# Patient Record
Sex: FEMALE | Race: OTHER | Employment: STUDENT | URBAN - METROPOLITAN AREA
[De-identification: names, ages, dates, MRNs, and addresses within clinical notes are randomized per-mention and may not be internally consistent; named-entity substitution may affect disease eponyms.]

---
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
## 2024-09-15 ENCOUNTER — HOSPITAL ENCOUNTER (EMERGENCY)
Facility: HOSPITAL | Age: 19
Discharge: HOME | End: 2024-09-15
Attending: EMERGENCY MEDICINE
Payer: COMMERCIAL

## 2024-09-15 ENCOUNTER — APPOINTMENT (OUTPATIENT)
Dept: ULTRASOUND IMAGING | Facility: HOSPITAL | Age: 19
End: 2024-09-15
Attending: EMERGENCY MEDICINE
Payer: COMMERCIAL

## 2024-09-15 VITALS
WEIGHT: 166 LBS | TEMPERATURE: 99 F | HEIGHT: 64 IN | SYSTOLIC BLOOD PRESSURE: 102 MMHG | HEART RATE: 79 BPM | BODY MASS INDEX: 28.34 KG/M2 | OXYGEN SATURATION: 100 % | DIASTOLIC BLOOD PRESSURE: 56 MMHG | RESPIRATION RATE: 22 BRPM

## 2024-09-15 DIAGNOSIS — O21.0 HYPEREMESIS GRAVIDARUM (HCC): Primary | ICD-10-CM

## 2024-09-15 DIAGNOSIS — O20.9 BLEEDING IN EARLY PREGNANCY (HCC): ICD-10-CM

## 2024-09-15 DIAGNOSIS — Z3A.01 LESS THAN 8 WEEKS GESTATION OF PREGNANCY (HCC): ICD-10-CM

## 2024-09-15 LAB
ALBUMIN SERPL-MCNC: 4.8 G/DL (ref 3.2–4.8)
ALBUMIN/GLOB SERPL: 1.3 {RATIO} (ref 1–2)
ALP LIVER SERPL-CCNC: 82 U/L
ALT SERPL-CCNC: 12 U/L
ANION GAP SERPL CALC-SCNC: 14 MMOL/L (ref 0–18)
AST SERPL-CCNC: 20 U/L (ref ?–34)
B-HCG SERPL-ACNC: ABNORMAL MIU/ML
B-HCG UR QL: POSITIVE
BASOPHILS # BLD AUTO: 0.02 X10(3) UL (ref 0–0.2)
BASOPHILS NFR BLD AUTO: 0.2 %
BILIRUB SERPL-MCNC: 0.6 MG/DL (ref 0.3–1.2)
BILIRUB UR QL STRIP.AUTO: NEGATIVE
BUN BLD-MCNC: 9 MG/DL (ref 9–23)
CALCIUM BLD-MCNC: 10.4 MG/DL (ref 8.7–10.4)
CHLORIDE SERPL-SCNC: 105 MMOL/L (ref 98–112)
CLARITY UR REFRACT.AUTO: CLEAR
CO2 SERPL-SCNC: 18 MMOL/L (ref 21–32)
CREAT BLD-MCNC: 0.55 MG/DL
EGFRCR SERPLBLD CKD-EPI 2021: 134 ML/MIN/1.73M2 (ref 60–?)
EOSINOPHIL # BLD AUTO: 0 X10(3) UL (ref 0–0.7)
EOSINOPHIL NFR BLD AUTO: 0 %
ERYTHROCYTE [DISTWIDTH] IN BLOOD BY AUTOMATED COUNT: 14.9 %
GLOBULIN PLAS-MCNC: 3.6 G/DL (ref 2–3.5)
GLUCOSE BLD-MCNC: 131 MG/DL (ref 70–99)
GLUCOSE UR STRIP.AUTO-MCNC: NORMAL MG/DL
HCT VFR BLD AUTO: 35.8 %
HGB BLD-MCNC: 12 G/DL
IMM GRANULOCYTES # BLD AUTO: 0.04 X10(3) UL (ref 0–1)
IMM GRANULOCYTES NFR BLD: 0.3 %
KETONES UR STRIP.AUTO-MCNC: >150 MG/DL
LEUKOCYTE ESTERASE UR QL STRIP.AUTO: NEGATIVE
LIPASE SERPL-CCNC: 27 U/L (ref 12–53)
LYMPHOCYTES # BLD AUTO: 0.67 X10(3) UL (ref 1–4)
LYMPHOCYTES NFR BLD AUTO: 5.8 %
MCH RBC QN AUTO: 26.3 PG (ref 26–34)
MCHC RBC AUTO-ENTMCNC: 33.5 G/DL (ref 31–37)
MCV RBC AUTO: 78.3 FL
MONOCYTES # BLD AUTO: 0.24 X10(3) UL (ref 0.1–1)
MONOCYTES NFR BLD AUTO: 2.1 %
NEUTROPHILS # BLD AUTO: 10.56 X10 (3) UL (ref 1.5–7.7)
NEUTROPHILS # BLD AUTO: 10.56 X10(3) UL (ref 1.5–7.7)
NEUTROPHILS NFR BLD AUTO: 91.6 %
NITRITE UR QL STRIP.AUTO: NEGATIVE
OSMOLALITY SERPL CALC.SUM OF ELEC: 284 MOSM/KG (ref 275–295)
PH UR STRIP.AUTO: 8 [PH] (ref 5–8)
PLATELET # BLD AUTO: 389 10(3)UL (ref 150–450)
POTASSIUM SERPL-SCNC: 3.6 MMOL/L (ref 3.5–5.1)
PROT SERPL-MCNC: 8.4 G/DL (ref 5.7–8.2)
PROT UR STRIP.AUTO-MCNC: 20 MG/DL
RBC # BLD AUTO: 4.57 X10(6)UL
RH BLOOD TYPE: POSITIVE
SODIUM SERPL-SCNC: 137 MMOL/L (ref 136–145)
SP GR UR STRIP.AUTO: 1.02 (ref 1–1.03)
UROBILINOGEN UR STRIP.AUTO-MCNC: NORMAL MG/DL
WBC # BLD AUTO: 11.5 X10(3) UL (ref 4–11)

## 2024-09-15 PROCEDURE — 93010 ELECTROCARDIOGRAM REPORT: CPT

## 2024-09-15 PROCEDURE — 99285 EMERGENCY DEPT VISIT HI MDM: CPT

## 2024-09-15 PROCEDURE — 80053 COMPREHEN METABOLIC PANEL: CPT | Performed by: EMERGENCY MEDICINE

## 2024-09-15 PROCEDURE — 81001 URINALYSIS AUTO W/SCOPE: CPT | Performed by: EMERGENCY MEDICINE

## 2024-09-15 PROCEDURE — 96361 HYDRATE IV INFUSION ADD-ON: CPT

## 2024-09-15 PROCEDURE — 84702 CHORIONIC GONADOTROPIN TEST: CPT | Performed by: EMERGENCY MEDICINE

## 2024-09-15 PROCEDURE — 96372 THER/PROPH/DIAG INJ SC/IM: CPT

## 2024-09-15 PROCEDURE — 83690 ASSAY OF LIPASE: CPT | Performed by: EMERGENCY MEDICINE

## 2024-09-15 PROCEDURE — 86901 BLOOD TYPING SEROLOGIC RH(D): CPT | Performed by: EMERGENCY MEDICINE

## 2024-09-15 PROCEDURE — 85025 COMPLETE CBC W/AUTO DIFF WBC: CPT | Performed by: EMERGENCY MEDICINE

## 2024-09-15 PROCEDURE — 85025 COMPLETE CBC W/AUTO DIFF WBC: CPT

## 2024-09-15 PROCEDURE — 96374 THER/PROPH/DIAG INJ IV PUSH: CPT

## 2024-09-15 PROCEDURE — 86900 BLOOD TYPING SEROLOGIC ABO: CPT | Performed by: EMERGENCY MEDICINE

## 2024-09-15 PROCEDURE — 76801 OB US < 14 WKS SINGLE FETUS: CPT | Performed by: EMERGENCY MEDICINE

## 2024-09-15 PROCEDURE — 81025 URINE PREGNANCY TEST: CPT

## 2024-09-15 PROCEDURE — 93005 ELECTROCARDIOGRAM TRACING: CPT

## 2024-09-15 PROCEDURE — 80053 COMPREHEN METABOLIC PANEL: CPT

## 2024-09-15 PROCEDURE — 76817 TRANSVAGINAL US OBSTETRIC: CPT | Performed by: EMERGENCY MEDICINE

## 2024-09-15 PROCEDURE — 96375 TX/PRO/DX INJ NEW DRUG ADDON: CPT

## 2024-09-15 PROCEDURE — 96376 TX/PRO/DX INJ SAME DRUG ADON: CPT

## 2024-09-15 RX ORDER — DIPHENHYDRAMINE HYDROCHLORIDE 50 MG/ML
25 INJECTION INTRAMUSCULAR; INTRAVENOUS ONCE
Status: COMPLETED | OUTPATIENT
Start: 2024-09-15 | End: 2024-09-15

## 2024-09-15 RX ORDER — DICYCLOMINE HCL 20 MG
20 TABLET ORAL 4 TIMES DAILY PRN
Qty: 30 TABLET | Refills: 0 | Status: SHIPPED | OUTPATIENT
Start: 2024-09-15 | End: 2024-10-15

## 2024-09-15 RX ORDER — DICYCLOMINE HYDROCHLORIDE 10 MG/ML
20 INJECTION INTRAMUSCULAR ONCE
Status: COMPLETED | OUTPATIENT
Start: 2024-09-15 | End: 2024-09-15

## 2024-09-15 RX ORDER — METOCLOPRAMIDE 10 MG/1
10 TABLET ORAL 3 TIMES DAILY PRN
Qty: 20 TABLET | Refills: 0 | Status: SHIPPED | OUTPATIENT
Start: 2024-09-15 | End: 2024-10-15

## 2024-09-15 RX ORDER — ONDANSETRON 2 MG/ML
4 INJECTION INTRAMUSCULAR; INTRAVENOUS ONCE
Status: COMPLETED | OUTPATIENT
Start: 2024-09-15 | End: 2024-09-15

## 2024-09-15 RX ORDER — MORPHINE SULFATE 4 MG/ML
4 INJECTION, SOLUTION INTRAMUSCULAR; INTRAVENOUS ONCE
Status: COMPLETED | OUTPATIENT
Start: 2024-09-15 | End: 2024-09-15

## 2024-09-15 RX ORDER — METOCLOPRAMIDE HYDROCHLORIDE 5 MG/ML
10 INJECTION INTRAMUSCULAR; INTRAVENOUS ONCE
Status: COMPLETED | OUTPATIENT
Start: 2024-09-15 | End: 2024-09-15

## 2024-09-15 NOTE — ED INITIAL ASSESSMENT (HPI)
Pt here for vomiting and abdominal pain  Per pt, \"I have a major stomach ache and can't stop throwing up. Yesterday, I just had some tacos and that's it.\"  On period currently. Smokes weed    Pt agitated in triage

## 2024-09-15 NOTE — ED PROVIDER NOTES
Patient Seen in: [facility] Emergency Department      History     Chief Complaint   Patient presents with    Nausea/Vomiting/Diarrhea     Stated Complaint: vomiting that started this morning    Subjective:   HPI    19-year-old female presents reporting pain in the epigastrium with nausea and vomiting.  Symptoms been present since this morning.  No diarrhea.  No fevers.  History of previous .  She does report regular marijuana use but says it has been a week.  She does not report frequent similar episodes.  Chart evaluation shows a visit 15 months ago with similar symptoms.  No urinary symptoms.  Currently on her menstrual cycle.    Objective:   Past Medical History:    ADHD    Anemia              Past Surgical History:   Procedure Laterality Date                      Social History     Socioeconomic History    Marital status: Single   Tobacco Use    Smoking status: Never     Passive exposure: Never    Smokeless tobacco: Never   Vaping Use    Vaping status: Never Used   Substance and Sexual Activity    Alcohol use: Not Currently    Drug use: Yes     Types: Cannabis, benzodiazepines     Comment: daily use of cannabis     Social Determinants of Health     Financial Resource Strain: Not at Risk (1/10/2024)    Received from [facility]    Financial Resource Strain     Financial Resource Strain: 1   Food Insecurity: Not at Risk (1/10/2024)    Received from [facility]    Food Insecurity     Food: 1   Transportation Needs: Not at Risk (1/10/2024)    Received from [facility]    Transportation Needs     Transportation: 1   Physical Activity: Not on File (1/10/2024)    Received from [facility]    Physical Activity     Physical Activity: 0   Stress: Not on File (1/10/2024)    Received from [facility]    Stress     Stress: 0   Social Connections: Not on File (9/10/2024)    Received from [facility]    Social Connections     Connectedness: 0   Housing Stability: Not at Risk (1/10/2024)    Received from [employer]    Housing Stability      [place]              Review of Systems    Positive for stated Chief Complaint: Nausea/Vomiting/Diarrhea    Other systems are as noted in HPI.  Constitutional and vital signs reviewed.      All other systems reviewed and negative except as noted above.    Physical Exam     ED Triage Vitals [09/15/24 1315]   /56   Pulse 81   Resp 22   Temp 98.7 °F (37.1 °C)   Temp src Temporal   SpO2 98 %   O2 Device None (Room air)       Current Vitals:   Vital Signs  BP: 102/56  Pulse: 90  Resp: 22  Temp: 98.7 °F (37.1 °C)  Temp src: Temporal    Oxygen Therapy  SpO2: 99 %  O2 Device: None (Room air)            Physical Exam    General:  Vitals as listed.  Appears uncomfortable  HEENT: Sclerae anicteric.  Conjunctivae show no pallor.  Oropharynx clear, mucous membranes moist   Neck: supple, no rigidity   Lungs: good air exchange and clear   Heart: regular rate rhythm and no murmur   Abdomen: Moderate tenderness on palpation of the epigastric region.  Negative Flores's.  Normal bowel sounds.  No abdominal masses.  No peritoneal signs   Extremities: no edema, normal peripheral pulses   Neuro: Alert oriented and nonfocal   Skin: no rashes or nodules    ED Course     Labs Reviewed   COMP METABOLIC PANEL (14) - Abnormal; Notable for the following components:       Result Value    Glucose 131 (*)     CO2 18.0 (*)     Total Protein 8.4 (*)     Globulin  3.6 (*)     All other components within normal limits   CBC WITH DIFFERENTIAL WITH PLATELET - Abnormal; Notable for the following components:    WBC 11.5 (*)     MCV 78.3 (*)     Neutrophil Absolute Prelim 10.56 (*)     Neutrophil Absolute 10.56 (*)     Lymphocyte Absolute 0.67 (*)     All other components within normal limits   URINALYSIS WITH CULTURE REFLEX - Abnormal; Notable for the following components:    Ketones Urine >150 (*)     Blood Urine 2+ (*)     Protein Urine 20 (*)     Bacteria Urine Rare (*)     Squamous Epi. Cells Few (*)     All other components within normal  limits   HCG, BETA SUBUNIT (QUANT PREGNANCY TEST) - Abnormal; Notable for the following components:    Hcg Quantitative 19,771.1 (*)     All other components within normal limits   POCT PREGNANCY URINE - Abnormal; Notable for the following components:    POCT Urine Pregnancy Positive (*)     All other components within normal limits   LIPASE - Normal   ABORH (BLOOD TYPE)   RAINBOW DRAW GOLD             US PREG 1ST TRIM W/EV (CPT=76801/36315)    Result Date: 9/15/2024  PROCEDURE:  US OB W/ EV 1ST TRIMESTER (CPT=76801/59690)  COMPARISON:  None.  INDICATIONS:  vomiting that started this morning  TECHNIQUE:  Transabdominal and endovaginal pelvic ultrasound examinations were performed.  PATIENT STATED HISTORY: (As transcribed by Technologist)  Patient states her LMP is 8/23/24 and that she has not stopped bleeding since.  She was not aware that she was pregnant until she came to the ER.       FINDINGS:  GESTATIONAL SAC:  Present and normal appearing. FETAL POLE:  Not identified YOLK SAC:  Present. CARDIAC ACTIVITY:  Not identified UTERUS:  Uterus measures 8.4 x 4.2 x 4.9 cm. PLACENTA:  Placenta is not visualized due to early gestational age.  There are multiple 2-5 mm hypoechoic collections around the gestational sac which could represent implantation bleed.  RIGHT OVARY:  Right ovary measures 2.1 x 1.2 x 2.7 cm and is otherwise unremarkable. LEFT OVARY:  Left ovary measures 3.2 x 1.8 x 3.1 cm.  There is a corpus luteum cyst in left ovary.  Left ovary is otherwise unremarkable. CUL-DE-SAC:  Normal. CLINICAL AGE:  3 weeks and 2 days SONOGRAPHIC AGE:  5 weeks and 3 days +/-3 days OTHER:  Negative.            CONCLUSION:  There is an intrauterine gestational sac and yolk sac identified.  There is no fetal pole or fetal cardiac activity detected.  If pregnancy test remains positive then short interval follow-up ultrasound is recommended.  Small amount of fluid  around the gestational sac could represent implantation bleed.    LOCATION:  [place]   Dictated by (CST): Temo Flaherty MD on 9/15/2024 at 6:45 PM     Finalized by (CST): Temo Flaherty MD on 9/15/2024 at 6:47 PM               MDM      19-year-old female presents reporting epigastric pain, nausea, vomiting.  No fevers.  Moderate tenderness on palpation of the epigastrium.    Additional history obtained by patient's mother who reports that the patient has been complaining over the last couple weeks that she just does not feel right.    Differential includes but is not limited to gastroenteritis, dehydration, metabolic disturbances, cannabis induced hyperemesis, pancreatitis, hepatobiliary disease, a life threat.    CBC, CMP, lipase ordered for further evaluation.  Morphine 4 mg IV, Zofran 4 mg IV, dicyclomine 20 mg IM, 1 L NS bolus.    Patient's urine test came back positive for pregnancy.  She says she has been bleeding vaginally for the past 3 weeks and she assumed it was an irregular menstrual cycle.  Beta-hCG, blood type, ultrasound pregnancy has been added to rule out ectopic pregnancy.  We discussed this may explain her symptoms of nausea and vomiting.    My independent interpretation of ultrasound for pregnancy is that there is a visible gestational sac.    Radiology reports that it measures at 5 weeks 3 days.  While there is a visible gestational sac there is no visible fetal pole or cardiac activity.  Reviewed this with the patient and discussed importance of following up with OB/GYN.  She states she will call her tomorrow.  Will discharge home with Reglan for nausea                                       Medical Decision Making      Disposition and Plan     Clinical Impression:  1. Hyperemesis gravidarum (HCC)    2. Less than 8 weeks gestation of pregnancy (HCC)    3. Bleeding in early pregnancy (HCC)         Disposition:  Discharge  9/15/2024  8:48 pm    Follow-up:  Call your OB/GYN tomorrow to schedule follow-up    Follow up      [facility] Emergency Department  [address]  [facility] [address]  [phone]  Follow up  If symptoms worsen          Medications Prescribed:  Current Discharge Medication List        START taking these medications    Details   metoclopramide 10 MG Oral Tab Take 1 tablet (10 mg total) by mouth 3 (three) times daily as needed.  Qty: 20 tablet, Refills: 0      dicyclomine 20 MG Oral Tab Take 1 tablet (20 mg total) by mouth 4 (four) times daily as needed.  Qty: 30 tablet, Refills: 0

## 2024-09-16 LAB
ATRIAL RATE: 79 BPM
P AXIS: 47 DEGREES
P-R INTERVAL: 150 MS
Q-T INTERVAL: 408 MS
QRS DURATION: 86 MS
QTC CALCULATION (BEZET): 467 MS
R AXIS: 36 DEGREES
T AXIS: 10 DEGREES
VENTRICULAR RATE: 79 BPM